# Patient Record
(demographics unavailable — no encounter records)

---

## 2025-01-09 NOTE — HISTORY OF PRESENT ILLNESS
[FreeTextEntry1] : 37-year-old lady presents for followup ofthyroid  cancer status post  total thyroidectomy on June 25, 2020. 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 There is no lymphatic or vessel  invasion no extrathyroidal extension no lymph node involvement.  She is currently on 125 mcg of levothyroxine, she is apparently tolerating it well.  She does complain of some back pain. There is no Surgical site infection or discharge. Her postoperative parathyroid hormone was normal, she continues to take calcium pills.    08/14/2020- FOLLOW UP doing well  blood work discussed  TSh low for goal  on lt4 100 mcg daily  no hyperthyroid symptoms  on high doses of calcium supplements   scar healing well  Review of system  no chest pain, no palpitations  no Shortness of breath,no wheezing.    10/16/2020- FOLLOW UP doing well  no c/o left axillary LAD, had flu shot few days back ,no fever, no night chills  on lt4 88 mcg daily  feels well otherwise, no other complains    11/06/2020- FOLLOW UP  Patient doing well left axillary swelling has resolved his TSH is at goal.  Occasionally complains of sleeplessness.  I reviewed the thyroid ultrasound results with her.  On levothyroxine 88 mcg daily feels well.  Thyroglobulin test results are pending.   02/11/2021- FOLLOW UP no complains  on lt4 88 mcg daily  feels well  no neck masses or local symptoms  feels well otherwise     05/14/2021- FOLLOW UP no complains  on lt4 88 mcg daily  feels well  no neck masses or local symptoms  feels well otherwise  discussed  most recent ultrasound with her.  I also discussed that she needs labs today but patient had them done at an outside lab on Monday 11/24/2021- FOLLOW UP no complains  on lt4 100 mcg daily  feels well  no neck masses or local symptoms  feels well otherwise   05/25/2022 no complains  on lt4 100 mcg daily  feels well  no neck masses or local symptoms  feels well otherwise  had labs done at [facility]  if not -reorder  [facility]G reveiwed and discussed with the tashia  f/u in 6 mth    09/15/2022- FOLLOW UP no complains  on lt4 100 mcg daily  feels well  no neck masses or local symptoms  feels well otherwise  had labs done at [facility]  if not -reorder  [facility] reordered  f/u in 6 [facility]   03/02/2023- FOLLOW UP Patient seen with . no complains  on lt4 100 mcg daily , TSH goal 0.5 to 2 miu/ml  feels well  no neck masses or local symptoms  usg reviewed  feels well otherwise  had labs done at [facility]  if not -reorder   f/u in 6 [facility]   09/07/2023- FOLLOW UP pt ok conversing in English . no complains  on lt4 100 mcg daily , TSH goal 0.5 to 2 miu/ml  feels well  no neck  local symptoms  usg reviewed  feels well otherwise  order labs  f/u in 6 [facility]   01/09/2025- FOLLOW UP This is patient's first visit with me, Dr. North. Oksana Is doing well overall without any new or worsening complaints.  She is taking levothyroxine 100 mcg daily, though 50 mcg on Sunday.  Her most recent thyroid studies are appropriate for her TSH suppression goal (0.5-2.0 uIU/mL) with TSH of 0.839 uIU/mL and free T4 of 1.47 ng/dL.  Her thyroglobulin thyroid tumor marker is reassuringly undetectable.  Recent calcium level was on the lower side, though sufficient.  She is not complaining of any symptoms suggestive of hypocalcemia.  I recommend she obtain neck ultrasound to evaluate for disease recurrence.  If there are no concerning findings, then she will have completed 5 years of surveillance and does not require any additional imaging unless she develops compelling symptoms in the neck to suggest disease recurrence.  Otherwise, she will continue taking levothyroxine as currently prescribed and follow-up with me in 6 months with routine thyroid studies obtained prior to that visit. The results of the testing will be communicated to the patient over the phone once they are available, along with any additional recommendations if necessary. All questions and concerns were fully addressed to patient's satisfaction. Patient is in agreement with stated plan.

## 2025-01-09 NOTE — PHYSICAL EXAM
[Alert] : alert [Healthy Appearance] : healthy appearance [No Acute Distress] : no acute distress [Well Developed] : well developed [Normal Voice/Communication] : normal voice communication [EOMI] : extra ocular movement intact [Normal Hearing] : hearing was normal [No Neck Mass] : no neck mass was observed [No LAD] : no lymphadenopathy [No Thyroid Nodules] : no palpable thyroid nodules [Well Healed Scar] : well healed scar [No Respiratory Distress] : no respiratory distress [Normal Rate and Effort] : normal respiratory rate and effort [Normal Rate] : heart rate was normal [Regular Rhythm] : with a regular rhythm [No Edema] : no peripheral edema [Not Distended] : not distended [Normal Anterior Cervical Nodes] : no anterior cervical lymphadenopathy [Spine Straight] : spine straight [No Stigmata of Cushings Syndrome] : no stigmata of Cushings Syndrome [Normal Gait] : normal gait [No Involuntary Movements] : no involuntary movements were seen [Normal Strength/Tone] : muscle strength and tone were normal [No Rash] : no rash [Normal Reflexes] : deep tendon reflexes were 2+ and symmetric [No Tremors] : no tremors [Oriented x3] : oriented to person, place, and time [Normal Affect] : the affect was normal [Recent Memory Normal] : recent memory was not impaired [Normal Insight/Judgement] : insight and judgment were intact [Normal Mood] : the mood was normal [Remote Memory Normal] : remote memory was not impaired [de-identified] : Empty thyroid bed bilaterally [de-identified] : No observed focal neurological deficits.

## 2025-01-09 NOTE — REASON FOR VISIT
[Follow - Up] : a follow-up visit [Hypothyroidism] : hypothyroidism [Thyroid Cancer] : thyroid cancer [Other: ______] : provided by [facility] [Time Spent: ____ minutes] : Total time spent using  services: [unfilled] minutes. The patient's primary language is not English thus required  services. [Interpreters_IDNumber] : [id number] [Interpreters_FullName] : Raul [TWNoteComboBox1] : Moroccan

## 2025-03-26 NOTE — ASSESSMENT
[FreeTextEntry1] : ## Lymphocytosis with neutropenia Flow cytometry suggestive of ? T cell lymphoproliferative disorder PET/CT without any malignancy  5/2019 Bone marrow. Largely normal. Flow cytometry immunophenotyping ([id number]) demonstrated decreased CD4:CD8 ratio (0.3:1, a non-specific finding). PNH flow negative, repeated in April 2022 which was also unremarkable.  -Labs are drawn in the office, reviewed, analyzed, and discussed repeated labs with normal WBCs and Lymphocytes  to continue to monitor.   #anemia with normal menses s/p Venofer 200 mg x 5 in May 2022, Feb 2023, 5/2024  normal menses Hgb 11.7 Ferritin pending - will give more IV venofer prn Ferritin < 50 -100  Uptodate: EGD Colonoscopy in March 2023 - normal  mammogram - annually recommended.  normal stress test in April 2023.   # Follicular variant of Papillary thyroid cancer s/p total thyroidectomy (6/25/20) Pathological Stage I Continue Synthroid No MOODY per endocrinology Follow up with Dr Johnson  d/w Dr. Saul  Follow up in 9  months.  CBC, CMP, LDH, ferritin

## 2025-03-26 NOTE — HISTORY OF PRESENT ILLNESS
[de-identified] : 45 year old female referred by Dr. Libertad Nieto for neutropenia. \par  \par  She had a routine physical - blood work showed normal WBC, but neutropenia\par  She has had a few repeat blood work\par  \par  She moved from [place] months back\par  She had a bad cold in December 2018\par  She was sick, had low appetite and lost 15 lbs\par  She has felt better since January 19- she has gained 10 lbs back\par  \par  \par  No fevers chills night sweats\par  No fatigue, tiredness, apathy\par  No appetite loss or weight loss/weight gain\par  No chest pain, shortness of breath, palpitation, dizziness\par  No abdominal pain, nausea, vomiting, diarrhea, constipation\par  No bright red blood per rectum, hematemesis or melena\par  No hematuria, dysuria, frequency, urgency\par  No headaches, visual changes, focal weakness, tingling, numbness\par  \par  Final Diagnosis:\par  Bone marrow core biopsy, clot section and aspirate smears.\par  Normocellular bone marrow with adequately maturing trilineage hematopoiesis.\par  No detectable involvement by lymphoma.\par  Markedly decreased stainable storage iron.\par  No evidence of overt dysplasia.\par  Normal Karyotype.\par  Comment:\par  Flow cytometry immunophenotyping ([id number]) demonstrated decreased CD4:CD8 ratio\par  (0.3:1, a non-specific finding).\par  Correlation with pertinent clinical, imaging and laboratory data is needed for complete diagnostic\par  interpretation\par  Edward Garcia On 05/21/2019 01:51 PM EST\par  \par  PET/CT: No evidence of lymphoma\par  \par  Blood work at [facility]\par                   3/18     3/29      4/1\par  WBC          7.36    6.13      7.8\par  N                21%    19%     29%\par  L                69%     67%     64% \par  \par  Blood work 4/16/19:\par  WBC 6.4 \par  ANC 1.3\par  L 4.7\par  \par  Peripheral blood, flow cytometric immunophenotyping:\par  Distinct CD8-positive T-cell population identified.  No\par  monotypic B-cell population or increase in blasts detected.\par  Comment:\par  The findings are of uncertain significance.  The\par  differential diagnosis includes a T-cell\par  lymphoproliferative disorder or a reactive process\par  characterized by expansion of a normal T-cell subset.\par  Correlation with clinical and laboratory findings is\par  suggested.\par  \par  Ultrasound thyroid\par  The larger nodule in the inferior pole of the left lobe is somewhat irregular and ill-defined. A follow-up study in 6 months time to ensure its stability is recommended. Alternatively, aspiration biopsy to establish a histologic diagnosis should be considered.\par  \par  Mammogram:\par  Birads 2\par  \par  She saw Dr Johnson, Endocrinology for thyroid nodules. s/p FNA biopsy of the ill-defined nodule (3/20). Pathology showed \par  Papillary thyroid carcinoma\par   [de-identified] : She is seen today for follow up    Jay # [id number] s/p Venofer 200 mg x in May 2022,  Feb 2023, 5/24/2024  She is very excited having a grand-daughter six months ago She has  moderate sleep apnea but not necessary needing CPAP as per her doctor.  Menses are normal = 4-5 days every 28 days Denies depression,  constipation/diarrhea, pain.

## 2025-03-26 NOTE — ASSESSMENT
[FreeTextEntry1] : ## Lymphocytosis with neutropenia Flow cytometry suggestive of ? T cell lymphoproliferative disorder PET/CT without any malignancy  5/2019 Bone marrow. Largely normal. Flow cytometry immunophenotyping ([id number]) demonstrated decreased CD4:CD8 ratio (0.3:1, a non-specific finding). PNH flow negative, repeated in April 2022 which was also unremarkable.  -Labs are drawn in the office, reviewed, analyzed, and discussed repeated labs with normal WBCs and Lymphocytes  to continue to monitor.   #anemia with normal menses s/p Venofer 200 mg x 5 in May 2022, Feb 2023, 5/2024  normal menses Hgb 11.7 Ferritin pending - will give more IV venofer prn Ferritin < 50 -100  Uptodate: EGD Colonoscopy in March 2023 - normal  mammogram - annually recommended.  normal stress test in April 2023.   # Follicular variant of Papillary thyroid cancer s/p total thyroidectomy (6/25/20) Pathological Stage I Continue Synthroid No MOODY per endocrinology Follow up with Dr Johnson  d/w Dr. Saul  Follow up in 9  months.  CBC, CMP, LDH, ferritin

## 2025-03-26 NOTE — ASSESSMENT
[FreeTextEntry1] : ## Lymphocytosis with neutropenia Flow cytometry suggestive of ? T cell lymphoproliferative disorder PET/CT without any malignancy  5/2019 Bone marrow. Largely normal. Flow cytometry immunophenotyping ([id number]) demonstrated decreased CD4:CD8 ratio (0.3:1, a non-specific finding). PNH flow negative, repeated in April 2022 which was also unremarkable.  -Labs are drawn in the office, reviewed, analyzed, and discussed repeated labs with normal WBCs and Lymphocytes  to continue to monitor.   #anemia with normal menses s/p Venofer 200 mg x 5 in May 2022, Feb 2023, 5/2024  normal menses Hgb 11.7 Ferritin pending - will give more IV venofer prn Ferritin < 50 -100  Uptodate: EGD Colonoscopy in March 2023 - normal  mammogram - annually recommended.  normal stress test in April 2023.   # Follicular variant of Papillary thyroid cancer s/p total thyroidectomy (6/25/20) Pathological Stage I Continue Synthroid No MOODY per endocrinology Follow up with Dr Johnson  d/w Dr. Saul  Follow up in 9  months.  CBC, CMP, LDH, ferritin

## 2025-03-26 NOTE — CONSULT LETTER
[Dear  ___] : Dear  [unfilled], [Courtesy Letter:] : I had the pleasure of seeing your patient, [unfilled], in my office today. [Please see my note below.] : Please see my note below. [Consult Closing:] : Thank you very much for allowing me to participate in the care of this patient.  If you have any questions, please do not hesitate to contact me. [Sincerely,] : Sincerely, [DrFaustino  ___] : Dr. POOLE [FreeTextEntry3] : Xander Saul MD, MPH\par  Attending Physician\par  Hematology Oncology\par  [facility] [place]\par  [facility]\par

## 2025-03-26 NOTE — HISTORY OF PRESENT ILLNESS
[de-identified] : 45 year old female referred by Dr. Libertad Neito for neutropenia. \par  \par  She had a routine physical - blood work showed normal WBC, but neutropenia\par  She has had a few repeat blood work\par  \par  She moved from [place] back\par  She had a bad cold in December 2018\par  She was sick, had low appetite and lost 15 lbs\par  She has felt better since January 19- she has gained 10 lbs back\par  \par  \par  No fevers chills night sweats\par  No fatigue, tiredness, apathy\par  No appetite loss or weight loss/weight gain\par  No chest pain, shortness of breath, palpitation, dizziness\par  No abdominal pain, nausea, vomiting, diarrhea, constipation\par  No bright red blood per rectum, hematemesis or melena\par  No hematuria, dysuria, frequency, urgency\par  No headaches, visual changes, focal weakness, tingling, numbness\par  \par  Final Diagnosis:\par  Bone marrow core biopsy, clot section and aspirate smears.\par  Normocellular bone marrow with adequately maturing trilineage hematopoiesis.\par  No detectable involvement by lymphoma.\par  Markedly decreased stainable storage iron.\par  No evidence of overt dysplasia.\par  Normal Karyotype.\par  Comment:\par  Flow cytometry immunophenotyping ([id number]) demonstrated decreased CD4:CD8 ratio\par  (0.3:1, a non-specific finding).\par  Correlation with pertinent clinical, imaging and laboratory data is needed for complete diagnostic\par  interpretation\par  Edward Garcia On 05/21/2019 01:51 PM EST\par  \par  PET/CT: No evidence of lymphoma\par  \par  Blood work at [facility]\par                   3/18     3/29      4/1\par  WBC          7.36    6.13      7.8\par  N                21%    19%     29%\par  L                69%     67%     64% \par  \par  Blood work 4/16/19:\par  WBC 6.4 \par  ANC 1.3\par  L 4.7\par  \par  Peripheral blood, flow cytometric immunophenotyping:\par  Distinct CD8-positive T-cell population identified.  No\par  monotypic B-cell population or increase in blasts detected.\par  Comment:\par  The findings are of uncertain significance.  The\par  differential diagnosis includes a T-cell\par  lymphoproliferative disorder or a reactive process\par  characterized by expansion of a normal T-cell subset.\par  Correlation with clinical and laboratory findings is\par  suggested.\par  \par  Ultrasound thyroid\par  The larger nodule in the inferior pole of the left lobe is somewhat irregular and ill-defined. A follow-up study in 6 months time to ensure its stability is recommended. Alternatively, aspiration biopsy to establish a histologic diagnosis should be considered.\par  \par  Mammogram:\par  Birads 2\par  \par  She saw Dr Johnson, Endocrinology for thyroid nodules. s/p FNA biopsy of the ill-defined nodule (3/20). Pathology showed \par  Papillary thyroid carcinoma\par   [de-identified] : She is seen today for follow up    Jay # [id number] s/p Venofer 200 mg x in May 2022,  Feb 2023, 5/24/2024  She is very excited having a grand-daughter six months ago She has  moderate sleep apnea but not necessary needing CPAP as per her doctor.  Menses are normal = 4-5 days every 28 days Denies depression,  constipation/diarrhea, pain.

## 2025-03-26 NOTE — CONSULT LETTER
[Dear  ___] : Dear  [unfilled], [Courtesy Letter:] : I had the pleasure of seeing your patient, [unfilled], in my office today. [Please see my note below.] : Please see my note below. [Consult Closing:] : Thank you very much for allowing me to participate in the care of this patient.  If you have any questions, please do not hesitate to contact me. [Sincerely,] : Sincerely, [DrFaustino  ___] : Dr. POOLE [FreeTextEntry3] : Xander Saul MD, MPH\par  Attending Physician\par  Hematology Oncology\par  [facility] [place]\par  [facility]\par

## 2025-03-26 NOTE — HISTORY OF PRESENT ILLNESS
[de-identified] : 45 year old female referred by Dr. Libertad Nieto for neutropenia. \par  \par  She had a routine physical - blood work showed normal WBC, but neutropenia\par  She has had a few repeat blood work\par  \par  She moved from [place] back\par  She had a bad cold in December 2018\par  She was sick, had low appetite and lost 15 lbs\par  She has felt better since January 19- she has gained 10 lbs back\par  \par  \par  No fevers chills night sweats\par  No fatigue, tiredness, apathy\par  No appetite loss or weight loss/weight gain\par  No chest pain, shortness of breath, palpitation, dizziness\par  No abdominal pain, nausea, vomiting, diarrhea, constipation\par  No bright red blood per rectum, hematemesis or melena\par  No hematuria, dysuria, frequency, urgency\par  No headaches, visual changes, focal weakness, tingling, numbness\par  \par  Final Diagnosis:\par  Bone marrow core biopsy, clot section and aspirate smears.\par  Normocellular bone marrow with adequately maturing trilineage hematopoiesis.\par  No detectable involvement by lymphoma.\par  Markedly decreased stainable storage iron.\par  No evidence of overt dysplasia.\par  Normal Karyotype.\par  Comment:\par  Flow cytometry immunophenotyping ([id number]) demonstrated decreased CD4:CD8 ratio\par  (0.3:1, a non-specific finding).\par  Correlation with pertinent clinical, imaging and laboratory data is needed for complete diagnostic\par  interpretation\par  Edward Garcia On 05/21/2019 01:51 PM EST\par  \par  PET/CT: No evidence of lymphoma\par  \par  Blood work at [facility]\par                   3/18     3/29      4/1\par  WBC          7.36    6.13      7.8\par  N                21%    19%     29%\par  L                69%     67%     64% \par  \par  Blood work 4/16/19:\par  WBC 6.4 \par  ANC 1.3\par  L 4.7\par  \par  Peripheral blood, flow cytometric immunophenotyping:\par  Distinct CD8-positive T-cell population identified.  No\par  monotypic B-cell population or increase in blasts detected.\par  Comment:\par  The findings are of uncertain significance.  The\par  differential diagnosis includes a T-cell\par  lymphoproliferative disorder or a reactive process\par  characterized by expansion of a normal T-cell subset.\par  Correlation with clinical and laboratory findings is\par  suggested.\par  \par  Ultrasound thyroid\par  The larger nodule in the inferior pole of the left lobe is somewhat irregular and ill-defined. A follow-up study in 6 months time to ensure its stability is recommended. Alternatively, aspiration biopsy to establish a histologic diagnosis should be considered.\par  \par  Mammogram:\par  Birads 2\par  \par  She saw Dr Johnson, Endocrinology for thyroid nodules. s/p FNA biopsy of the ill-defined nodule (3/20). Pathology showed \par  Papillary thyroid carcinoma\par   [de-identified] : She is seen today for follow up    Jay # [id number] s/p Venofer 200 mg x in May 2022,  Feb 2023, 5/24/2024  She is very excited having a grand-daughter six months ago She has  moderate sleep apnea but not necessary needing CPAP as per her doctor.  Menses are normal = 4-5 days every 28 days Denies depression,  constipation/diarrhea, pain.

## 2025-03-26 NOTE — DISCUSSION/SUMMARY
[FreeTextEntry1] : Hgb 10.9\par  Ferritin 12 (previously 10 in 9/22/20)\par  Advised to have Venofer 200 mg x 5\par  to return in three months for follow up\par  Advised for colonoscopy screening and work up for anemia.

## 2025-03-26 NOTE — CONSULT LETTER
[Dear  ___] : Dear  [unfilled], [Courtesy Letter:] : I had the pleasure of seeing your patient, [unfilled], in my office today. [Please see my note below.] : Please see my note below. [Consult Closing:] : Thank you very much for allowing me to participate in the care of this patient.  If you have any questions, please do not hesitate to contact me. [Sincerely,] : Sincerely, [DrFaustino  ___] : Dr. POOLE [FreeTextEntry3] : Xander Saul MD, MPH\par  Attending Physician\par  Hematology Oncology\par  [facility] [place]\par  [facility]\par

## 2025-07-21 NOTE — HISTORY OF PRESENT ILLNESS
[FreeTextEntry1] : RUTH ANN HAWTHORNE is a 52 year old female being seen for a follow-up visit for hypothyroidism and thyroid cancer.  History of Present Illness Patient is being seen in coverage today for Dr. Gandara      52-year-old lady presents for follow up of thyroid cancer status post total thyroidectomy on June 25, 2020. 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 There is no lymphatic or vessel invasion no extrathyroidal extension no lymph node involvement. She is currently on 100 mcg of levothyroxine M-F with 1/2 tab on saturday and Sunday  She realized she was only supposed to take a 1/2 pill on Sunday, Her postoperative parathyroid hormone was normal, she continues to take calcium pills.  08/14/2020- FOLLOW UP doing well blood work discussed TSh low for goal on lt4 100 mcg daily no hyperthyroid symptoms on high doses of calcium supplements scar healing well Review of system no chest pain, no palpitations no Shortness of breath,no wheezing.   10/16/2020- FOLLOW UP doing well no c/o left axillary LAD, had flu shot few days back ,no fever, no night chills on lt4 88 mcg daily feels well otherwise, no other complains   11/06/2020- FOLLOW UP  Patient doing well left axillary swelling has resolved his TSH is at goal. Occasionally complains of sleeplessness. I reviewed the thyroid ultrasound results with her. On levothyroxine 88 mcg daily feels well. Thyroglobulin test results are pending.   02/11/2021- FOLLOW UP no complains on lt4 88 mcg daily feels well no neck masses or local symptoms feels well otherwise    05/14/2021- FOLLOW UP no complains on lt4 88 mcg daily feels well no neck masses or local symptoms feels well otherwise discussed most recent ultrasound with her. I also discussed that she needs labs today but patient had them done at an outside lab on Monday 11/24/2021- FOLLOW UP no complains on lt4 100 mcg daily feels well no neck masses or local symptoms feels well otherwise  05/25/2022 no complains on lt4 100 mcg daily feels well no neck masses or local symptoms feels well otherwise [facility] -reorder USG reveiwed and discussed with the tashia f/u in 6 [facility]   09/15/2022- FOLLOW UP no complains on lt4 100 mcg daily feels well no neck masses or local symptoms feels well otherwise had labs done at [facility] if not -reorder USG reordered f/u in 6 [facility]  03/02/2023- FOLLOW UP Patient seen with . no complains on lt4 100 mcg daily , TSH goal 0.5 to 2 miu/ml feels well no neck masses or local symptoms usg reviewed feels well otherwise had labs done at [facility] if not -reorder f/u in 6 [facility]  09/07/2023- FOLLOW UP pt ok conversing in English . no complains on lt4 100 mcg daily , TSH goal 0.5 to 2 miu/ml feels well no neck local symptoms usg reviewed feels well otherwise order labs f/u in 6 [facility]  01/09/2025- FOLLOW UP This is patient's first visit with Dr. North. Ruth Ann Is doing well overall without any new or worsening complaints. She is taking levothyroxine 100 mcg daily, though 50 mcg on Sunday. Her most recent thyroid studies are appropriate for her TSH suppression goal (0.5-2.0 uIU/mL) with TSH of 0.839 uIU/mL and free T4 of 1.47 ng/dL. Her thyroglobulin thyroid tumor marker is reassuringly undetectable. Recent calcium level was on the lower side, though sufficient. She is not complaining of any symptoms suggestive of hypocalcemia. I recommend she obtain neck ultrasound to evaluate for disease recurrence. If there are no concerning findings, then she will have completed 5 years of surveillance and does not require any additional imaging unless she develops compelling symptoms in the neck to suggest disease recurrence. Otherwise, she will continue taking levothyroxine as currently prescribed and follow-up with me in 6 months with routine thyroid studies obtained prior to that visit. The results of the testing will be communicated to the patient over the phone once they are available, along with any additional recommendations if necessary. All questions and concerns were fully addressed to patient's satisfaction. Patient is in agreement with stated plan.  June 2025 Patient offers no complaints.  No neck pain, no heat intolerance, no cold intolerance. She was taking a 1/2 tab on Saturday and Sunday when she should have been taking LT4 M-S with one tab on Sunday.  TSH 2.25 out of range should be 0.5-2.0 FT4 1.3 T3 84 CBC wnl Vitamin CATINA 28.3 Calcium 8,9 PTH 57 Thyroid US showed normal thyroid bed with no lymphadenopathy (6/2025)

## 2025-07-21 NOTE — PHYSICAL EXAM
[Alert] : alert [No Neck Mass] : no neck mass was observed [de-identified] : thyroid absent no lymphadenopathy cervical